# Patient Record
Sex: FEMALE | Race: OTHER
[De-identification: names, ages, dates, MRNs, and addresses within clinical notes are randomized per-mention and may not be internally consistent; named-entity substitution may affect disease eponyms.]

---

## 2019-12-29 ENCOUNTER — HOSPITAL ENCOUNTER (OUTPATIENT)
Dept: HOSPITAL 84 - D.ER | Age: 49
Setting detail: OBSERVATION
LOS: 1 days | Discharge: HOME | End: 2019-12-30
Attending: INTERNAL MEDICINE | Admitting: INTERNAL MEDICINE
Payer: SELF-PAY

## 2019-12-29 VITALS — SYSTOLIC BLOOD PRESSURE: 147 MMHG | DIASTOLIC BLOOD PRESSURE: 84 MMHG

## 2019-12-29 VITALS
BODY MASS INDEX: 35.51 KG/M2 | HEIGHT: 63 IN | WEIGHT: 200.42 LBS | BODY MASS INDEX: 35.51 KG/M2 | HEIGHT: 63 IN | WEIGHT: 200.42 LBS | BODY MASS INDEX: 35.51 KG/M2

## 2019-12-29 VITALS — SYSTOLIC BLOOD PRESSURE: 178 MMHG | DIASTOLIC BLOOD PRESSURE: 85 MMHG

## 2019-12-29 VITALS — SYSTOLIC BLOOD PRESSURE: 162 MMHG | DIASTOLIC BLOOD PRESSURE: 90 MMHG

## 2019-12-29 DIAGNOSIS — I10: ICD-10-CM

## 2019-12-29 DIAGNOSIS — R07.9: Primary | ICD-10-CM

## 2019-12-29 DIAGNOSIS — R06.02: ICD-10-CM

## 2019-12-29 LAB
ALBUMIN SERPL-MCNC: 3.3 G/DL (ref 3.4–5)
ALP SERPL-CCNC: 95 U/L (ref 46–116)
ALT SERPL-CCNC: 64 U/L (ref 10–68)
ANION GAP SERPL CALC-SCNC: 14.2 MMOL/L (ref 8–16)
BASOPHILS NFR BLD AUTO: 0.3 % (ref 0–2)
BILIRUB SERPL-MCNC: 0.25 MG/DL (ref 0.2–1.3)
BUN SERPL-MCNC: 20 MG/DL (ref 7–18)
CALCIUM SERPL-MCNC: 8.3 MG/DL (ref 8.5–10.1)
CHLORIDE SERPL-SCNC: 108 MMOL/L (ref 98–107)
CK MB SERPL-MCNC: 0.9 U/L (ref 0–3.6)
CK MB SERPL-MCNC: 1.1 U/L (ref 0–3.6)
CK SERPL-CCNC: 117 UL (ref 21–215)
CK SERPL-CCNC: 97 UL (ref 21–215)
CO2 SERPL-SCNC: 26.4 MMOL/L (ref 21–32)
CREAT SERPL-MCNC: 0.9 MG/DL (ref 0.6–1.3)
EOSINOPHIL NFR BLD: 1.4 % (ref 0–7)
ERYTHROCYTE [DISTWIDTH] IN BLOOD BY AUTOMATED COUNT: 14.3 % (ref 11.5–14.5)
GLOBULIN SER-MCNC: 3.7 G/L
GLUCOSE SERPL-MCNC: 133 MG/DL (ref 74–106)
HCT VFR BLD CALC: 42.8 % (ref 36–48)
HGB BLD-MCNC: 13.7 G/DL (ref 12–16)
IMM GRANULOCYTES NFR BLD: 0.1 % (ref 0–5)
LYMPHOCYTES NFR BLD AUTO: 32.8 % (ref 15–50)
MAGNESIUM SERPL-MCNC: 2.6 MG/DL (ref 1.8–2.4)
MCH RBC QN AUTO: 26.9 PG (ref 26–34)
MCHC RBC AUTO-ENTMCNC: 32 G/DL (ref 31–37)
MCV RBC: 83.9 FL (ref 80–100)
MONOCYTES NFR BLD: 5.9 % (ref 2–11)
NEUTROPHILS NFR BLD AUTO: 59.5 % (ref 40–80)
NT-PROBNP SERPL-MCNC: 60 PG/ML (ref 0–125)
OSMOLALITY SERPL CALC.SUM OF ELEC: 293 MOSM/KG (ref 275–300)
PLATELET # BLD: 131 10X3/UL (ref 130–400)
PMV BLD AUTO: 12 FL (ref 7.4–10.4)
POTASSIUM SERPL-SCNC: 3.6 MMOL/L (ref 3.5–5.1)
PROT SERPL-MCNC: 7 G/DL (ref 6.4–8.2)
RBC # BLD AUTO: 5.1 10X6/UL (ref 4–5.4)
SODIUM SERPL-SCNC: 145 MMOL/L (ref 136–145)
TROPONIN I SERPL-MCNC: < 0.017 NG/ML (ref 0–0.06)
TROPONIN I SERPL-MCNC: < 0.017 NG/ML (ref 0–0.06)
WBC # BLD AUTO: 7.3 10X3/UL (ref 4.8–10.8)

## 2019-12-29 NOTE — EC
PATIENT:MICHEAL TRISTAN                DATE OF SERVICE: 12/29/19
SEX: F                                  MEDICAL RECORD: [id number]
DATE OF BIRTH: [birth year]                        LOCATION:[facility]
AGE OF PATIENT: 49                             ADMISSION DATE: 12/29/19
 
REFERRING PHYSICIAN:                               
 
INTERPRETING PHYSICIAN: DEVIN KU MD             
 
 
 
                             ECHOCARDIOGRAM REPORT
  ECHO CHARGES 4               ECHO COMPLETE                 Date: 12/30/19
 
 
 
CLINICAL DIAGNOSIS: SOB                           
 
                         ECHOCARDIOGRAPHIC MEASUREMENTS
      (adult normal given)
   AC root (d.<3.7cm) 2.6  cm   LV Septum d (<1.2 cm> 1.4  cm
      Valve Excursion 2.0  cm     LV Septum (systole) 1.7  cm
Left Atria (s.<4.0cm> 3.9  cm          LVPW d(<1.2cm) 1.3  cm
        RV (d.<2.3cm) 3.0  cm           LVPW (sytole) 1.4  cm
  LV diastole(<5.6CM) 4.8  cm       MV E-F(>70mm/sec)      cm
           LV systole 3.6  cm           LVOT Diameter 1.8  cm
       MV exc.(>10mm)      cm
Est.ejection fraction (50-75%)     %
 
   DOPPLER:
     LVIT      cm/sec A 83   cm/sec E 67    cm/sec
       LA      cm/sec      RVSP 20.0 mmHg
     LVOT 95   cm/sec   AOP1/2T      m/s
  Asc. Ao 134  cm/sec
     RVOT 85   cm/sec
       RA      cm/sec
       PA 93   cm/sec
 AV Gradient Peak 7.1  mmHg  AV Mean 3.3  mmHg  AV Area 2.3  cm
 MV Gradient Peak 3.8  mmHg  MV Mean 2.0  mmHg  MV Area      cm
   COMMENTS:                                              
 
 
 Cardiac Sonographer: Tiffanie               AVERY DOWNS             
      Cardiologist: 1          Dr. Ku                
             TAPE# PACS           
                                       Pericardial Effusion N                        
 
 
DATE OF SERVICE:  
 
ECHOCARDIOGRAM
 
FINDINGS:
1.  Left ventricular chamber size is within normal limits.  Left ventricular
systolic function is normal.  Overall ejection fraction estimated at 55% to 60%.
2.  Left atrium and right atrium ventricular chamber sizes are within normal
limits.
3.  Valvular structures have normal structure and motion.
 
 
 
ECHOCARDIOGRAM REPORT                          [id number]    MICHEAL TRISTAN        
 
 
4.  Doppler interrogation reveals no significant valvular insufficiency or
stenosis.
5.  No evidence of pericardial effusion or left ventricular thrombus.
 
TRANSINT:[id number] Voice Confirmation ID: [id number] DOCUMENT ID: [id number]
                                           
                                           DEVIN KU MD             
 
 
 
 
 
 
 
 
 
 
 
 
 
 
 
 
 
 
 
 
 
 
 
 
 
 
 
 
 
 
 
 
 
 
 
 
 
 
 
CC:                                                             [id number]
DICTATION DATE: 12/31/19 1013     :     12/31/19 1259      DIS IN  
                                                                      12/30/19
[id number] [place] [id number]

## 2019-12-29 NOTE — ST
PATIENT:MICHEAL TRISTAN                          MEDICAL RECORD: [id number]
SEX: F                                                   LOCATION:D[facility]
ORDER #:                                                 ADMISSION DATE: 12/29/19
AGE OF PATIENT: 49            
 
REFERRING PHYSICIAN:                                                             
 
INTERPRETING PHYSICIAN: DEVIN PIEDRA MD             

 
 
DATE OF SERVICE:  12/30/2019
 
PROCEDURE:  Nuclear stress test.
 
INDICATION:  Chest pain.
 
TECHNIQUE:  She was exercised on standard Lexiscan protocol with 30 mCi of
sestamibi injected at peak stress, 9 mCi used previously for rest images.
 
FINDINGS:  Gated SPECT reveals preserved ejection fraction at 60% with good wall
motion and thickening and brightening throughout all segments.  SPECT imaging
Cardiolite was used as myocardial fusion agent.  There is homogeneous uptake
throughout all segments at rest and stress with no evidence of inducible
ischemia or previous infarction.
 
OVERALL IMPRESSION:
1.  This is a normal nuclear stress test with no evidence of inducible ischemia
or previous infarction.
2.  Gated SPECT reveals a preserved ejection fraction at 60%.  In this patient
with ongoing symptomatology, the current scan does not suggest the presence of
hemodynamically significant coronary artery disease.  Evaluate noncardiac
etiology of chest pain.
 
TRANSINT:[id number] Voice Confirmation ID: [id number] DOCUMENT ID: [id number]
 
 
                                           
                                           DEVIN PIEDRA MD             
 
 
 
 
 
 
 
 
 
 
 
CC:                                                             [id number]
DICTATION DATE: 12/30/19 1636     :     12/31/19 0727      DIS IN  
                                                                      12/30/19
[facility]                                          
[address] [place] [address]

## 2019-12-29 NOTE — NUR
PT AMBULATED TO RESTROOM INDEPENDENTLY WITHOUT DIFFICULY. NO DISTRESS NOTED.
COLOR WNL FOR RACE. RESPIRATIONS ARE EVEN AND UNLABORED. VSS.  WILL CONTINUE TO
MONITOR PATIENT.

## 2019-12-29 NOTE — DS
PATIENT:MICHEAL TRISTAN                  :70   MEDICAL RECORD: [id number]
 
                              DISCHARGE SUMMARY
                                                         
ADMISSION DATE:    19                       DISCHARGE DATE:     19
 
 
DATE OF DISCHARGE:  2019
 
DIAGNOSES:
1.  Chest pain.
2.  Hypertension.
 
HISTORY OF PRESENT ILLNESS:  Mrs. Tristan had chest pain; however, nuclear
stress test was normal.  Echocardiogram was overall normal as well.  Discharged
home with no change in her medications.  Follow up with primary care physician
for noncardiac etiology of chest pain.
 
TRANSINT:[id number] Voice Confirmation ID: [id number] DOCUMENT ID: [id number]
                                           
                                           DEVIN PIEDRA MD             
 
 
 
 
 
 
 
 
 
 
 
 
 
 
 
 
 
 
 
 
 
 
 
 
 
 
 
 
 
CC:                                                             [id number]
DICTATION DATE: 19 1635     :     19 0725      DIS IN  
                                                                      19
[id number] [place] [address]

## 2019-12-29 NOTE — NUR
RECEIVED PT FROM ER VIA W/C WITH C/O CHEST PAIN AND H/A. STATES CHEST PAIN IS
MILD AT THIS TIME. NITRO PATCH NOTED TO LT CHEST WALL. RESP EVEN AND
NONLABORED. V/S STABLE. ALERT AND ORIENTED. SALINE LOCK NOTED TO LT FOREARM.
DIFF COMMUNICATING DUE TO LANGUAGE BARRIER, SPEAKS LITTLE ENGLISH. DIGITAL
AUDIO  USED. CL IN REACH.

## 2019-12-30 VITALS — DIASTOLIC BLOOD PRESSURE: 90 MMHG | SYSTOLIC BLOOD PRESSURE: 174 MMHG

## 2019-12-30 VITALS — SYSTOLIC BLOOD PRESSURE: 131 MMHG | DIASTOLIC BLOOD PRESSURE: 70 MMHG

## 2019-12-30 VITALS — SYSTOLIC BLOOD PRESSURE: 150 MMHG | DIASTOLIC BLOOD PRESSURE: 79 MMHG

## 2019-12-30 VITALS — SYSTOLIC BLOOD PRESSURE: 167 MMHG | DIASTOLIC BLOOD PRESSURE: 89 MMHG

## 2019-12-30 VITALS — DIASTOLIC BLOOD PRESSURE: 82 MMHG | SYSTOLIC BLOOD PRESSURE: 151 MMHG

## 2019-12-30 VITALS — DIASTOLIC BLOOD PRESSURE: 68 MMHG | SYSTOLIC BLOOD PRESSURE: 129 MMHG

## 2019-12-30 LAB
CK MB SERPL-MCNC: 0.6 U/L (ref 0–3.6)
CK SERPL-CCNC: 88 UL (ref 21–215)
TROPONIN I SERPL-MCNC: < 0.017 NG/ML (ref 0–0.06)

## 2019-12-30 NOTE — NUR
DISCHARGE INSTRUCTIONS GIVEN. SEEMS TO UNDERSTAND INSTRUCTIONS. IV OUT TIP
INTACT. TELEMETRY OFF AND RETURNED. FLU SHOT GIVEN PER ORDER. LEFT WITH
HOSPITAL STAFF TO GO HOME IN PERSONAL RIDE WITH FRIEND.

## 2019-12-30 NOTE — NUR
PT RESTING IN BED. NO SIGNS OF DISTRESS. IV TO LEFT FORARM PATENT NO REDNESS
OR TENDERNESS. ON TELEMETRY 68 SR. DENIES ANY FURTHER NEED AT THIS TIME. CALL
LIGHT IN REACH. BED LOW POSITION. NO FAMILY AT BEDSIDE.

## 2019-12-30 NOTE — HP
PATIENT: MICHEAL TRISTAN                                MEDICAL RECORD: [id number]
ACCOUNT: [id number]                                    LOCATION:D.[id number]
: 70                                            ADMISSION DATE: 19
                                                         PCP: No PCP                 
 
                             HISTORY AND PHYSICAL EXAMINATION
 
 
DIAGNOSES:
1.  Chest pain.
2.  Shortness of breath.
3.  Hypertension.
 
HISTORY OF PRESENT ILLNESS:  Mrs. Tristan has no history of ischemic heart
disease.  She has a history of hypertension for which she is on
lisinopril/hydrochlorothiazide.  She began having shortness of breath and chest
discomfort over the past week in an escalating fashion, relatively typical
anginal discomfort, a heavy pressure-like sensation across the anterior chest
associated with increasing shortness of breath, episodes with any exertion, as
the week got on even minor exertion would cause this symptomatology, so she
presented to the Emergency Room.  Her troponin is normal.  Her EKG is with no
ST-T changes.
 
PHYSICAL EXAMINATION:
CONSTITUTIONAL/GENERAL APPEARANCE:  Well nourished, well developed, appears
stated age.
EYES:  Lids and conjunctivae noninjected.  No discharge.  No pallor.
ENT:  Lips within normal limit.  No cyanosis.  No pallor.
NECK:  Carotid arteries, bilateral normal upstroke.  No bruits.  No thrills.  No
jugular venous pressure or distention.
CERVICAL LYMPH NODES:  Nontender.  Nonenlarged.
THYROID:  Not enlarged.  No nodules.
CARDIOVASCULAR:  Precordial exam, nondisplaced.  No heaves or pericardial
thrills.  Rate and rhythm, regular.  Heart sounds, normal S1, normal S2.  No S3,
no gallop, no rub.  Systolic murmur, not heard.  Diastolic murmur, not heard.
RESPIRATORY:  Respiratory effort, unlabored.  Normal curvature.  No thoracic
deformity.  No chest wall tenderness.  Percussion, resonant.  Auscultation,
clear.  No wheezes, no rales, no rhonchi.
ABDOMEN:  Soft, nondistended, nontender.  No abdominal pain, no vomiting and
normal appetite.
MUSCULOSKELETAL:  No joint tenderness, normal gait, normal tone.
SKIN:  Warm and dry.
 
OVERALL IMPRESSION:  Chest pain compatible with angina in an escalating fashion,
associated with shortness of breath.  We will risk stratify with stress testing
Cardiolite imaging.  Further care depends upon findings of the stress test.
 
TRANSINT:[id number] Voice Confirmation ID: [id number] DOCUMENT ID: [id number]
 
 
 
 
 
HISTORY AND PHYSICAL                           [id number]    MICHEAL TRISTAN JEFFREY MD             
 
 
 
Electronically Signed by DEVIN PIEDRA on 19 at 1623
 
 
 
 
 
 
 
 
 
 
 
 
 
 
 
 
 
 
 
 
 
 
 
 
 
 
 
 
 
 
 
 
 
 
 
 
 
 
 
 
 
CC:                                                             [id number]
DICTATION DATE: 1939     :     19 0952      ADM IN  
                                                                              
[facility]                                          
[address] [place] [address]

## 2019-12-30 NOTE — NUR
RESTED WELL TONIGHT. TELEMETRY SHOWS SR. C/O H/A. DENIES CHEST PAIN. CL IN
REACH. HAS BEEN NPO SINCE MIDNIGHT.